# Patient Record
Sex: FEMALE | URBAN - METROPOLITAN AREA
[De-identification: names, ages, dates, MRNs, and addresses within clinical notes are randomized per-mention and may not be internally consistent; named-entity substitution may affect disease eponyms.]

---

## 2017-08-22 ENCOUNTER — INPATIENT (INPATIENT)
Facility: HOSPITAL | Age: 57
LOS: 1 days | Discharge: HOME | End: 2017-08-24
Attending: HOSPITALIST | Admitting: HOSPITALIST
Payer: MEDICAID

## 2017-08-22 VITALS
RESPIRATION RATE: 18 BRPM | OXYGEN SATURATION: 98 % | HEART RATE: 80 BPM | DIASTOLIC BLOOD PRESSURE: 98 MMHG | SYSTOLIC BLOOD PRESSURE: 144 MMHG | TEMPERATURE: 98 F

## 2017-08-22 LAB
ALBUMIN SERPL ELPH-MCNC: 4.2 G/DL — SIGNIFICANT CHANGE UP (ref 3.3–5)
ALP SERPL-CCNC: 56 U/L — SIGNIFICANT CHANGE UP (ref 40–120)
ALT FLD-CCNC: 21 U/L — SIGNIFICANT CHANGE UP (ref 4–33)
APTT BLD: 31.2 SEC — SIGNIFICANT CHANGE UP (ref 27.5–37.4)
AST SERPL-CCNC: 29 U/L — SIGNIFICANT CHANGE UP (ref 4–32)
BASOPHILS # BLD AUTO: 0.03 K/UL — SIGNIFICANT CHANGE UP (ref 0–0.2)
BASOPHILS NFR BLD AUTO: 0.5 % — SIGNIFICANT CHANGE UP (ref 0–2)
BILIRUB SERPL-MCNC: 0.4 MG/DL — SIGNIFICANT CHANGE UP (ref 0.2–1.2)
BUN SERPL-MCNC: 10 MG/DL — SIGNIFICANT CHANGE UP (ref 7–23)
CALCIUM SERPL-MCNC: 9.2 MG/DL — SIGNIFICANT CHANGE UP (ref 8.4–10.5)
CHLORIDE SERPL-SCNC: 102 MMOL/L — SIGNIFICANT CHANGE UP (ref 98–107)
CK MB BLD-MCNC: 1.36 NG/ML — SIGNIFICANT CHANGE UP (ref 1–4.7)
CK SERPL-CCNC: 92 U/L — SIGNIFICANT CHANGE UP (ref 25–170)
CO2 SERPL-SCNC: 24 MMOL/L — SIGNIFICANT CHANGE UP (ref 22–31)
CREAT SERPL-MCNC: 0.74 MG/DL — SIGNIFICANT CHANGE UP (ref 0.5–1.3)
EOSINOPHIL # BLD AUTO: 0.15 K/UL — SIGNIFICANT CHANGE UP (ref 0–0.5)
EOSINOPHIL NFR BLD AUTO: 2.6 % — SIGNIFICANT CHANGE UP (ref 0–6)
GLUCOSE SERPL-MCNC: 127 MG/DL — HIGH (ref 70–99)
HCT VFR BLD CALC: 40.9 % — SIGNIFICANT CHANGE UP (ref 34.5–45)
HGB BLD-MCNC: 14.2 G/DL — SIGNIFICANT CHANGE UP (ref 11.5–15.5)
IMM GRANULOCYTES # BLD AUTO: 0.02 # — SIGNIFICANT CHANGE UP
IMM GRANULOCYTES NFR BLD AUTO: 0.3 % — SIGNIFICANT CHANGE UP (ref 0–1.5)
INR BLD: 0.95 — SIGNIFICANT CHANGE UP (ref 0.88–1.17)
LYMPHOCYTES # BLD AUTO: 2.05 K/UL — SIGNIFICANT CHANGE UP (ref 1–3.3)
LYMPHOCYTES # BLD AUTO: 35 % — SIGNIFICANT CHANGE UP (ref 13–44)
MCHC RBC-ENTMCNC: 31.8 PG — SIGNIFICANT CHANGE UP (ref 27–34)
MCHC RBC-ENTMCNC: 34.7 % — SIGNIFICANT CHANGE UP (ref 32–36)
MCV RBC AUTO: 91.5 FL — SIGNIFICANT CHANGE UP (ref 80–100)
MONOCYTES # BLD AUTO: 0.54 K/UL — SIGNIFICANT CHANGE UP (ref 0–0.9)
MONOCYTES NFR BLD AUTO: 9.2 % — SIGNIFICANT CHANGE UP (ref 2–14)
NEUTROPHILS # BLD AUTO: 3.06 K/UL — SIGNIFICANT CHANGE UP (ref 1.8–7.4)
NEUTROPHILS NFR BLD AUTO: 52.4 % — SIGNIFICANT CHANGE UP (ref 43–77)
NRBC # FLD: 0 — SIGNIFICANT CHANGE UP
PLATELET # BLD AUTO: 204 K/UL — SIGNIFICANT CHANGE UP (ref 150–400)
PMV BLD: 11.6 FL — SIGNIFICANT CHANGE UP (ref 7–13)
POTASSIUM SERPL-MCNC: 4.1 MMOL/L — SIGNIFICANT CHANGE UP (ref 3.5–5.3)
POTASSIUM SERPL-SCNC: 4.1 MMOL/L — SIGNIFICANT CHANGE UP (ref 3.5–5.3)
PROT SERPL-MCNC: 7.5 G/DL — SIGNIFICANT CHANGE UP (ref 6–8.3)
PROTHROM AB SERPL-ACNC: 10.6 SEC — SIGNIFICANT CHANGE UP (ref 9.8–13.1)
RBC # BLD: 4.47 M/UL — SIGNIFICANT CHANGE UP (ref 3.8–5.2)
RBC # FLD: 12 % — SIGNIFICANT CHANGE UP (ref 10.3–14.5)
SODIUM SERPL-SCNC: 138 MMOL/L — SIGNIFICANT CHANGE UP (ref 135–145)
TROPONIN T SERPL-MCNC: < 0.06 NG/ML — SIGNIFICANT CHANGE UP (ref 0–0.06)
WBC # BLD: 5.85 K/UL — SIGNIFICANT CHANGE UP (ref 3.8–10.5)
WBC # FLD AUTO: 5.85 K/UL — SIGNIFICANT CHANGE UP (ref 3.8–10.5)

## 2017-08-22 RX ORDER — PROPRANOLOL HCL 160 MG
40 CAPSULE, EXTENDED RELEASE 24HR ORAL DAILY
Qty: 0 | Refills: 0 | Status: DISCONTINUED | OUTPATIENT
Start: 2017-08-22 | End: 2017-08-23

## 2017-08-22 RX ORDER — SIMVASTATIN 20 MG/1
20 TABLET, FILM COATED ORAL AT BEDTIME
Qty: 0 | Refills: 0 | Status: DISCONTINUED | OUTPATIENT
Start: 2017-08-22 | End: 2017-08-24

## 2017-08-22 RX ADMIN — SIMVASTATIN 20 MILLIGRAM(S): 20 TABLET, FILM COATED ORAL at 23:17

## 2017-08-22 NOTE — ED CDU PROVIDER NOTE - PROGRESS NOTE DETAILS
CDU LUIS Winston Progress Note:  Pt notes resolution of symptoms.  Pt states she never had weakness, but only numbness and tingling which has completely resolved.  Repeat neuro exam normal:  CN II-XII normal.  5/5 UE and LE strength.  Rapid alternating movements intact.  Negative Rhomberg.  Negative Pronator drift.  Ambulatory without assistance.  Sensation intact to light touch. MRI performed, awaiting results. Dr Mcgee  pt admitted overnight to CDU. Resting comfortable wo any complaints  w daughter at bedside. NO headache, no weakness or numbness. no cp or sob. vss. AOx3, no focal deficits. CN 2-12 grossly intact. nl gait. no meningeal signs. neg pronator drift. pending MRI read and neurology to  see pt. CDU LUIS Winston Progress Note:  Pt notes resolution of symptoms.  No headaches overnight or at this time.  Pt states she never had weakness, but only numbness and tingling which has completely resolved.  Repeat neuro exam normal:  CN II-XII normal.  5/5 UE and LE strength.  Rapid alternating movements intact.  Negative Rhomberg.  Negative Pronator drift.  Ambulatory without assistance.  Sensation intact to light touch. MRI performed, awaiting results. CDU LUIS Winston Progress Note:  Neurology recommending Echo and Loop recorder.  Called Dr. Maddox's office who states they do not take pt's insurance. Discussed case with PMD Dr. Cutler who states he recommends contacting Dr. Geronimo Gasca to establish outpatient cardiology follow up.  At this time, spoke with  Mechelle Tim who states she is unable to make an appointment until Dr. Gasca comes into office at 2:30 PM.  Awaiting call back at this time. CDU LU[facility] Winston Progress Note:  No call back at this time.  Called Dr. Gasca's office and spoke with Mechelle Tim who states that Dr. Gasca not yet in office and will have Dr. Gasca call me back.  Awaiting call back. CDU LU[facility] Winston Progress Note:  No call back at this time from Dr. Gasca.  Discussed case with Dr. Cutler who states that if pt unable to establish outpatient follow up, agrees with admission.  Called Dr. Gasca's office.  Informed staff that pt will be admitted at this time. Dr Mcgee  pt feeling better.  Pt and daughter informed of plan to admit given unable to ensure close outpt follow up. PT to be admitted for furhter workup for CVA. CDU PA Catrachito Admit Note:  Pt admitted to Dr. Gloria Mariee.  Tele PA text paged. CDU LU[facility] Winston Progress Note:  No call back at this time from Dr. Gasca.  Discussed case with Dr. Cutler who states that if pt unable to establish outpatient follow up, agrees with admission to hospitalist.  Called Dr. Gasca's office.  Informed staff that pt will be admitted at this time.

## 2017-08-22 NOTE — ED ADULT NURSE NOTE - CHIEF COMPLAINT QUOTE
pt feels heaviness in right side of body and        right side of head this episode started 45 minutes ago  pt took htn meds today right sided headache  FIT eval at triage  for stroke eval.finger stick is 130 at triage  code stroke called by holly hilario

## 2017-08-22 NOTE — ED PROVIDER NOTE - ATTENDING CONTRIBUTION TO CARE
Dr. Pompa:  I have personally performed a face to face bedside history and physical examination of this patient. I have discussed the history, examination, review of systems, assessment and plan of management with the resident. I have reviewed the electronic medical record and amended it to reflect my history, review of systems, physical exam, assessment and plan.    57F h/o HTN, HLD, presents with right upper and lower extremity weakness/tingling/spasm x 2 hours prior to ED presentation.  Denies fever/chills, cp, sob, n/v/d, urinary symptoms.  Denies any similar prior episodes.  Family caught her prior to falling.      Exam:  - nad  - rrr  - ctab  - abd soft, ntnd  - perrl, eomi, cn2-12 grossly intact, sensation to light touch grossly equal bilaterally, 5/5 strength in upper and lower extremity, finger to nose intact, gait normal    A/P  - consider TIA/CVA  - cbc, cmp, trop  - ekg  - CT head  - neuro consult

## 2017-08-22 NOTE — ED CDU PROVIDER NOTE - ATTENDING CONTRIBUTION TO CARE
Dr Pompa Dr. Pompa:  I performed a face to face bedside interview with patient regarding history of present illness, review of symptoms and past medical history. I completed an independent physical exam.  I have discussed patient's plan of care with PA.   I agree with note as stated above, having amended the EMR as needed to reflect my findings.   This includes HISTORY OF PRESENT ILLNESS, HIV, PAST MEDICAL/SURGICAL/FAMILY/SOCIAL HISTORY, ALLERGIES AND HOME MEDICATIONS, REVIEW OF SYSTEMS, PHYSICAL EXAM, and any PROGRESS NOTES during the time I functioned as the attending physician for this patient.    57F h/o HTN, HLD, presents with R sided weakness.    Exam:  - Dr. Pompa:  I performed a face to face bedside interview with patient regarding history of present illness, review of symptoms and past medical history. I completed an independent physical exam.  I have discussed patient's plan of care with PA.   I agree with note as stated above, having amended the EMR as needed to reflect my findings.   This includes HISTORY OF PRESENT ILLNESS, HIV, PAST MEDICAL/SURGICAL/FAMILY/SOCIAL HISTORY, ALLERGIES AND HOME MEDICATIONS, REVIEW OF SYSTEMS, PHYSICAL EXAM, and any PROGRESS NOTES during the time I functioned as the attending physician for this patient.    57F h/o HTN, HLD, presents with R sided weakness.    Exam:  - nad  - rrr  - ctab  - abd soft, ntnd  - no focal neuro deficits on exam, sensation to light touch and motor equal bilateral extremities    A/P  - R sided weakness now seemingly resolved, possible CVA/TIA  - CDU for MRI  - f/u neurology recommendations

## 2017-08-22 NOTE — CONSULT NOTE ADULT - ATTENDING COMMENTS
57-year-old right-handed lady of [place] descent first evaluated in the CDU at [facility] on 8/23/17 with right-sided weakness. On 8/22/17, she developed right-sided weakness of her arm and leg. She now feels improved. ROS otherwise negative. Exam. Neurologic exam was normal, including motor exam on the right. CT head (8/22/17) to my eye was unremarkable. MRI brain (8/23/17) to my eye showed a punctate left ROBERT infarct in the callosomarginal distribution. MRA neck and head (8/23/17) to my eye was unremarkable. Impression. On 8/22/17 she developed right hemiparesis with subsequent resolution, due to a small left ROBERT infarct of unknown cause, but consistent with embolic stroke of unknown source. Suggest. MACIE; implantable loop recorder; both can be done as an outpatient to be arranged with cardiology; aspirin and Plavix for 3 weeks, followed by stopping Plavix and continuing aspirin; neurologically cleared for discharge.

## 2017-08-22 NOTE — CONSULT NOTE ADULT - ASSESSMENT
56 y/o RH  woman with hx of HTN, HLD, inner ear problem p/w right ear pressure and subjective feeling over the right side. Neurological exam shows only slight drift in RLE. TPA not given as symptoms were improving and given the mildness of her symptoms she would likely not benefit from TPA and the risks were greater. This was discussed with the patient and family. Likely not an acute stroke, more likely related to her head pressure/pain.       - Tylenol for headache  -Continue to monitor  - If symptoms persist consider CDU for MRI/MRA head and neck, and start Asprin 81 daily.   - D/w Dr. Saucedo.

## 2017-08-22 NOTE — ED CDU PROVIDER NOTE - OBJECTIVE STATEMENT
58 yo F presenting with 2 hour hx of R sided arm and leg weakness. Pt became unstable because of the R leg weakness but did not fall or have head trauma or LOC. Pt also had associated heaviness of her head that has since resolved. No hx of sx. Pt did not tx at home and came straight to the hospital. Pt took propranolol today not simvastatin.     meds: propranolol, simvastatin    PCP: Austin Smith ED Provider note:56 yo F presenting with 2 hour hx of R sided arm and leg weakness. Pt became unstable because of the R leg weakness but did not fall or have head trauma or LOC. Pt also had associated heaviness of her head that has since resolved. No hx of sx. Pt did not tx at home and came straight to the hospital. Pt took propranolol today not simvastatin.   meds: propranolol, simvastatin  PCP: Austin Smith    CDU LUIS Barrientos note: I agree with above. 56 yo F presenting with 2 hour hx of R sided arm and leg weakness. Pt became unstable because of the R leg weakness but did not fall or have head trauma or LOC. Ct brain stroke protocol was unremarkable in ED, neuro evaluated the pt sent to CDU for MRI/ MRA and neuro re eval. Pt denies dizziness, weakness, blurry vision, numbness tingling, sob, cp, abdominal pain or dysuria. ED Provider note:56 yo F presenting with 2 hour hx of R sided arm and leg weakness. Pt became unstable because of the R leg weakness but did not fall or have head trauma or LOC. Pt also had associated heaviness of her head that has since resolved. No hx of sx. Pt did not tx at home and came straight to the hospital. Pt took propranolol today not simvastatin.   meds: propranolol, simvastatin  PCP: Austin Smith    CDU PA Floyd note: I agree with above. 56 yo F with pmhx of HTN, HLD, presents with 2 hour hx of R sided arm and leg weakness. Pt became unstable because of the R leg weakness but did not fall or have head trauma or LOC. Ct brain stroke protocol was unremarkable in ED, neuro evaluated the pt sent to CDU for MRI/ MRA and neuro re eval. Pt denies dizziness, weakness, blurry vision, numbness tingling, sob, cp, abdominal pain or dysuria.

## 2017-08-22 NOTE — ED ADULT TRIAGE NOTE - CHIEF COMPLAINT QUOTE
pt feels heaviness in right side of body and        right side of head this episode started 45 minutes ago  pt took htn meds today right sided headache  FIT eval at triage  for stroke eval. pt feels heaviness in right side of body and        right side of head this episode started 45 minutes ago  pt took htn meds today right sided headache  FIT eval at triage  for stroke eval.finger stick is 130 at triage  code stroke called pt feels heaviness in right side of body and        right side of head this episode started 45 minutes ago  pt took htn meds today right sided headache  FIT eval at triage  for stroke eval.finger stick is 130 at triage  code stroke called by holly hilario

## 2017-08-22 NOTE — ED ADULT NURSE NOTE - OBJECTIVE STATEMENT
patient is a 57 y.o female received as a code stroke a&ox4 presenting with a c/c of weakness in RUE and RLE.  patient last seen normal at 1630 hours.  upon assessment, patient GCS of 15, negative facial droop, pronator drift, slurred speech, aphasia.  Negative deficits in visual fields, blurry vision, diplopia, negative limb ataxia, strength in upper extremities noted to be equal.  RLE noted to have deficits in comparison to LLE.  patient reports HA 5 out of 10.  20 gauge PIV placed in left ac, labs drawn, md presently evaluating.

## 2017-08-22 NOTE — ED PROVIDER NOTE - CRANIAL NERVE AND PUPILLARY EXAM
cranial nerves 2-12 intact/tongue is midline/central and peripheral vision intact/extra-ocular movements intact

## 2017-08-22 NOTE — ED PROVIDER NOTE - MEDICAL DECISION MAKING DETAILS
58 yo F with PMH of HTN and HLD presenting with 2 hours of R arm and R leg numbness. Neuro exam normal. Head CT normal. Will admit for MRI of brain per neuro recs.

## 2017-08-22 NOTE — ED PROVIDER NOTE - OBJECTIVE STATEMENT
58 yo F presenting with 2 hour hx of R sided arm and leg weakness. Pt became unstable because of the R leg weakness but did not fall or have head trauma or LOC. Pt also had associated heaviness of her head that has since resolved. No hx of sx. Pt did not tx at home and came straight to the hospital. Pt took propranolol today not simvastatin.     meds: propranolol, simvastatin    PCP: Austin Smith

## 2017-08-22 NOTE — ED PROVIDER NOTE - FAMILY HISTORY
Mother  Still living? Unknown  Family history of stroke, Age at diagnosis: Age Unknown  Family history of hypertension, Age at diagnosis: Age Unknown     Father  Still living? Unknown  Family history of hypertension, Age at diagnosis: Age Unknown

## 2017-08-22 NOTE — ED CDU PROVIDER NOTE - MEDICAL DECISION MAKING DETAILS
58 y/o F pt sent to CDU by neuro for neuro checks, MRI / MRA , neuro re eval, will monitor and reassess

## 2017-08-23 DIAGNOSIS — I10 ESSENTIAL (PRIMARY) HYPERTENSION: ICD-10-CM

## 2017-08-23 DIAGNOSIS — R73.03 PREDIABETES: ICD-10-CM

## 2017-08-23 DIAGNOSIS — I63.9 CEREBRAL INFARCTION, UNSPECIFIED: ICD-10-CM

## 2017-08-23 DIAGNOSIS — Z29.9 ENCOUNTER FOR PROPHYLACTIC MEASURES, UNSPECIFIED: ICD-10-CM

## 2017-08-23 DIAGNOSIS — E78.00 PURE HYPERCHOLESTEROLEMIA, UNSPECIFIED: ICD-10-CM

## 2017-08-23 LAB
CK MB BLD-MCNC: 1.27 NG/ML — SIGNIFICANT CHANGE UP (ref 1–4.7)
CK MB BLD-MCNC: SIGNIFICANT CHANGE UP (ref 0–2.5)
CK SERPL-CCNC: 63 U/L — SIGNIFICANT CHANGE UP (ref 25–170)
HBA1C BLD-MCNC: 5.8 % — HIGH (ref 4–5.6)
TROPONIN T SERPL-MCNC: < 0.06 NG/ML — SIGNIFICANT CHANGE UP (ref 0–0.06)

## 2017-08-23 PROCEDURE — 70544 MR ANGIOGRAPHY HEAD W/O DYE: CPT | Mod: 26,59

## 2017-08-23 PROCEDURE — 70551 MRI BRAIN STEM W/O DYE: CPT | Mod: 26

## 2017-08-23 PROCEDURE — 99223 1ST HOSP IP/OBS HIGH 75: CPT | Mod: GC

## 2017-08-23 PROCEDURE — 70548 MR ANGIOGRAPHY NECK W/DYE: CPT | Mod: 26

## 2017-08-23 RX ORDER — CLOPIDOGREL BISULFATE 75 MG/1
75 TABLET, FILM COATED ORAL DAILY
Qty: 0 | Refills: 0 | Status: DISCONTINUED | OUTPATIENT
Start: 2017-08-23 | End: 2017-08-24

## 2017-08-23 RX ORDER — PROPRANOLOL HCL 160 MG
40 CAPSULE, EXTENDED RELEASE 24HR ORAL DAILY
Qty: 0 | Refills: 0 | Status: DISCONTINUED | OUTPATIENT
Start: 2017-08-24 | End: 2017-08-24

## 2017-08-23 RX ORDER — PROPRANOLOL HCL 160 MG
1 CAPSULE, EXTENDED RELEASE 24HR ORAL
Qty: 0 | Refills: 0 | COMMUNITY

## 2017-08-23 RX ORDER — SIMVASTATIN 20 MG/1
1 TABLET, FILM COATED ORAL
Qty: 0 | Refills: 0 | COMMUNITY

## 2017-08-23 RX ORDER — HEPARIN SODIUM 5000 [USP'U]/ML
5000 INJECTION INTRAVENOUS; SUBCUTANEOUS EVERY 12 HOURS
Qty: 0 | Refills: 0 | Status: DISCONTINUED | OUTPATIENT
Start: 2017-08-23 | End: 2017-08-24

## 2017-08-23 RX ORDER — ASPIRIN/CALCIUM CARB/MAGNESIUM 324 MG
81 TABLET ORAL DAILY
Qty: 0 | Refills: 0 | Status: DISCONTINUED | OUTPATIENT
Start: 2017-08-23 | End: 2017-08-24

## 2017-08-23 RX ADMIN — Medication 40 MILLIGRAM(S): at 07:03

## 2017-08-23 NOTE — H&P ADULT - FAMILY HISTORY
<<-----Click on this checkbox to enter Family History Family history of hypertension     Mother  Still living? No  Family history of stroke, Age at diagnosis: Age Unknown

## 2017-08-23 NOTE — H&P ADULT - PROBLEM SELECTOR PLAN 1
Patient with positive MRI findings of acute CVA   Will continue to monitor on telemetry  TSH, lipid profile, CBC, CMP in am   House neurology following and their recommendation noted   TTE with bubble study   Continue with aspirin 81mg, Plavix 75mg and Zocor 20mg QHS daily   Seizure, fall and aspiration precautions   Elevate head of the bed to 30 degree  PMNR ordered   Neuro checks q4hrs Patient with positive MRI findings of acute CVA   Will continue to monitor on telemetry  TSH, lipid profile, CBC, CMP in am   House neurology following and their recommendation noted   TTE with bubble study   Continue with aspirin 81mg, Plavix 75mg and Zocor 20mg QHS daily   Seizure, fall and aspiration precautions   Elevate head of the bed to 30 degree  PMNR ordered   Neuro checks q4hrs  Will need to call house cardiology consult in am Right arm weakness and right leg weakness 2/2 CVA   Patient with positive MRI findings of acute CVA   Will continue to monitor on telemetry  TSH, lipid profile, CBC, CMP in am   House neurology following and their recommendation noted   TTE with bubble study   Continue with aspirin 81mg, Plavix 75mg and Zocor 20mg QHS daily   Seizure, fall and aspiration precautions   Elevate head of the bed to 30 degree  PMNR ordered   Neuro checks q4hrs  Will need to call house cardiology consult in am

## 2017-08-23 NOTE — H&P ADULT - RS GEN PE MLT RESP DETAILS PC
clear to auscultation bilaterally/breath sounds equal/airway patent/no wheezes/no rales/good air movement/respirations non-labored/no chest wall tenderness/no intercostal retractions/normal/no rhonchi

## 2017-08-23 NOTE — H&P ADULT - NEGATIVE GASTROINTESTINAL SYMPTOMS
no change in bowel habits/no diarrhea/no melena/no constipation/no vomiting/no abdominal pain/no nausea/no hematochezia

## 2017-08-23 NOTE — H&P ADULT - NEGATIVE CARDIOVASCULAR SYMPTOMS
no orthopnea/no palpitations/no chest pain/no paroxysmal nocturnal dyspnea/no dyspnea on exertion/no peripheral edema

## 2017-08-23 NOTE — H&P ADULT - NEGATIVE ENMT SYMPTOMS
no nasal congestion/no hearing difficulty/no nasal discharge/no ear pain/no sinus symptoms/no vertigo/no tinnitus

## 2017-08-23 NOTE — H&P ADULT - NEGATIVE NEUROLOGICAL SYMPTOMS
no tremors/no hemiparesis/no vertigo/no focal seizures/no generalized seizures/no loss of consciousness/no loss of sensation/no difficulty walking/no transient paralysis/no syncope/no confusion

## 2017-08-23 NOTE — H&P ADULT - GASTROINTESTINAL DETAILS
no distention/no guarding/nontender/no rigidity/bowel sounds normal/normal/no rebound tenderness/soft

## 2017-08-23 NOTE — H&P ADULT - NEGATIVE MUSCULOSKELETAL SYMPTOMS
no arthritis/no muscle weakness/no neck pain/no myalgia/no arthralgia/no muscle cramps/no stiffness/no joint swelling

## 2017-08-23 NOTE — H&P ADULT - NEGATIVE OPHTHALMOLOGIC SYMPTOMS
no lacrimation R/no lacrimation L/no discharge R/no photophobia/no diplopia/no blurred vision R/no blurred vision L/no discharge L

## 2017-08-23 NOTE — H&P ADULT - ASSESSMENT
58 y/o F with PMH of GERD, HLD, HTN presented with the complaint of right sided arm and leg weakness. R/o CVA    +Acute-subacute lacunar infarct of the left posterior/medial frontal lobe  NIHSS 1				 MRS 0 			Dysphagia=Passed

## 2017-08-23 NOTE — H&P ADULT - HISTORY OF PRESENT ILLNESS
*HPI and ROS was obtained from patient's daughters and  who was at bedside and assisted with translation*    58 y/o F with PMH of GERD, HLD, HTN presented with the complaint of right sided arm and leg weakness. As per patient she was in the shower yesterday at 5pm when she developed sudden onset right sided upper and lower extremity weakness and numbness and she felt an odd sensation on the right side and stumbled a little. Family denied her falling or hitting her head. Patient also endorsed occipital head "pressure" when the weakness started. Family stated that after this episode she was able to ambulate with assistance. Family denied any changes in vision, slurred speech, contralateral weakness or facial droop. Family stated that she has a hx of ear problems and diagnosed by ENT with eustachian tube collapse which she experiences as a pressure-like sensation but no dizziness. Patient denied any CP, SOB, fevers, chills, N/V/D/C, headaches, abdominal pain, melena, hematochezia, recent travel, sick contact, pleuritic or positional chest pain.     On ED admission EKG revealed CE x 1: Negative, Gluc: 127, HgbA1C: 5.8. CT head: No acute intracranial bleeding, mass effect, or evidence of an acute territorial infarct. MRI head: Acute-subacute lacunar infarct of the left posterior/medial frontal lobe. No acute hemorrhage. BRAIN MRA:  No significant stenosis, occlusion, or aneurysm. NECK MRA: Extracranial MR angiography is within normal limits by NASCET criteria. 8/23 Neurology: Suggest. MACIE; implantable loop recorder; both can be done as an outpatient to be arranged with cardiology; aspirin and Plavix for 3 weeks, followed by stopping Plavix and continuing aspirin; neurologically cleared for discharge. When examined patient is resting in the stretcher and endorsed right shoulder pain, denied any other complaints. *HPI and ROS was obtained from patient's daughters and  who was at bedside and assisted with translation*    56 y/o F with PMH of GERD, HLD, HTN presented with the complaint of right sided arm and leg weakness. As per patient she was in the shower yesterday at 5pm when she developed sudden onset right sided upper and lower extremity weakness and numbness and she felt an odd sensation on the right side and stumbled a little. Family denied her falling or hitting her head. Patient also endorsed occipital head "pressure" when the weakness started. Family stated that after this episode she was able to ambulate with assistance. Family denied any changes in vision, slurred speech, contralateral weakness or facial droop. Family stated that she has a hx of ear problems and diagnosed by ENT with eustachian tube collapse which she experiences as a pressure-like sensation but no dizziness. Patient denied any CP, SOB, fevers, chills, N/V/D/C, headaches, abdominal pain, melena, hematochezia, recent travel, sick contact, pleuritic or positional chest pain.     On ED admission EKG revealed NSR at a rate of 76 with possible left atrial enlargement, with TWI in lead V2-V3. Qtc of 425, CE x 1: Negative, Gluc: 127, HgbA1C: 5.8. CT head: No acute intracranial bleeding, mass effect, or evidence of an acute territorial infarct. MRI head: Acute-subacute lacunar infarct of the left posterior/medial frontal lobe. No acute hemorrhage. BRAIN MRA:  No significant stenosis, occlusion, or aneurysm. NECK MRA: Extracranial MR angiography is within normal limits by NASCET criteria. 8/23 Neurology: Suggest. MACIE; implantable loop recorder; both can be done as an outpatient to be arranged with cardiology; aspirin and Plavix for 3 weeks, followed by stopping Plavix and continuing aspirin; neurologically cleared for discharge. When examined patient is resting in the stretcher and endorsed right shoulder pain, denied any other complaints. *HPI and ROS was obtained from patient's daughters and  who was at bedside and assisted with translation*    58 y/o F with PMH of HLD, HTN presented with the complaint of right sided arm and leg weakness. As per patient she was in the shower yesterday at 5pm when she developed sudden onset right sided upper and lower extremity weakness and numbness and she felt an odd sensation on the right side and stumbled a little. Family denied her falling or hitting her head. Patient also endorsed occipital head "pressure" when the weakness started. Family stated that after this episode she was able to ambulate with assistance. Family denied any changes in vision, slurred speech, contralateral weakness or facial droop. Family stated that she has a hx of ear problems and diagnosed by ENT with eustachian tube collapse which she experiences as a pressure-like sensation but no dizziness. Patient denied any CP, SOB, fevers, chills, N/V/D/C, abdominal pain, melena, hematochezia, dysuria, recent travel, sick contact, pleuritic or positional chest pain.     On ED admission EKG revealed NSR at a rate of 76 with possible left atrial enlargement, with TWI in lead V2-V3. Qtc of 425, CE x 1: Negative, Gluc: 127, HgbA1C: 5.8. CT head: No acute intracranial bleeding, mass effect, or evidence of an acute territorial infarct. MRI head: Acute-subacute lacunar infarct of the left posterior/medial frontal lobe. No acute hemorrhage. BRAIN MRA:  No significant stenosis, occlusion, or aneurysm. NECK MRA: Extracranial MR angiography is within normal limits by NASCET criteria. 8/23 Neurology: Suggest. MACIE; implantable loop recorder; both can be done as an outpatient to be arranged with cardiology; aspirin and Plavix for 3 weeks, followed by stopping Plavix and continuing aspirin; neurologically cleared for discharge. When examined patient is resting in the stretcher and endorsed right shoulder pain, denied any other complaints.

## 2017-08-23 NOTE — H&P ADULT - PROBLEM SELECTOR PLAN 4
HgbA1C of 5.8   Instructed patient about   FS TID and at bedtime HgbA1C of 5.8   Patient counselled on diet and exercise  FS TID and at bedtime

## 2017-08-23 NOTE — H&P ADULT - NSHPLABSRESULTS_GEN_ALL_CORE
.  LABS:                         14.2   5.85  )-----------( 204      ( 22 Aug 2017 18:13 )             40.9     08-22    138  |  102  |  10  ----------------------------<  127<H>  4.1   |  24  |  0.74    Ca    9.2      22 Aug 2017 18:13    TPro  7.5  /  Alb  4.2  /  TBili  0.4  /  DBili  x   /  AST  29  /  ALT  21  /  AlkPhos  56  08-22    PT/INR - ( 22 Aug 2017 18:13 )   PT: 10.6 SEC;   INR: 0.95          PTT - ( 22 Aug 2017 18:13 )  PTT:31.2 SEC    CARDIAC MARKERS ( 23 Aug 2017 21:15 )  x     / < 0.06 ng/mL / 63 u/L / 1.27 ng/mL / x      CARDIAC MARKERS ( 22 Aug 2017 18:13 )  x     / < 0.06 ng/mL / 92 u/L / 1.36 ng/mL / x        EKG reviewed personally:  normal sinus rhythm at a rate of 76 with possible left atrial enlargement, with TWI in lead V2-V3. Qtc of 425    RADIOLOGY, EKG & ADDITIONAL TESTS: Reviewed.

## 2017-08-24 ENCOUNTER — TRANSCRIPTION ENCOUNTER (OUTPATIENT)
Age: 57
End: 2017-08-24

## 2017-08-24 VITALS
DIASTOLIC BLOOD PRESSURE: 78 MMHG | TEMPERATURE: 98 F | OXYGEN SATURATION: 99 % | HEART RATE: 70 BPM | RESPIRATION RATE: 16 BRPM | SYSTOLIC BLOOD PRESSURE: 148 MMHG

## 2017-08-24 LAB
BUN SERPL-MCNC: 14 MG/DL — SIGNIFICANT CHANGE UP (ref 7–23)
CALCIUM SERPL-MCNC: 9.5 MG/DL — SIGNIFICANT CHANGE UP (ref 8.4–10.5)
CHLORIDE SERPL-SCNC: 103 MMOL/L — SIGNIFICANT CHANGE UP (ref 98–107)
CHOLEST SERPL-MCNC: 255 MG/DL — HIGH (ref 120–199)
CO2 SERPL-SCNC: 24 MMOL/L — SIGNIFICANT CHANGE UP (ref 22–31)
CREAT SERPL-MCNC: 0.72 MG/DL — SIGNIFICANT CHANGE UP (ref 0.5–1.3)
GLUCOSE SERPL-MCNC: 98 MG/DL — SIGNIFICANT CHANGE UP (ref 70–99)
HCT VFR BLD CALC: 46.8 % — HIGH (ref 34.5–45)
HDLC SERPL-MCNC: 51 MG/DL — SIGNIFICANT CHANGE UP (ref 45–65)
HGB BLD-MCNC: 16.4 G/DL — HIGH (ref 11.5–15.5)
LIPID PNL WITH DIRECT LDL SERPL: 191 MG/DL — SIGNIFICANT CHANGE UP
MAGNESIUM SERPL-MCNC: 2.1 MG/DL — SIGNIFICANT CHANGE UP (ref 1.6–2.6)
MCHC RBC-ENTMCNC: 32.9 PG — SIGNIFICANT CHANGE UP (ref 27–34)
MCHC RBC-ENTMCNC: 35 % — SIGNIFICANT CHANGE UP (ref 32–36)
MCV RBC AUTO: 93.8 FL — SIGNIFICANT CHANGE UP (ref 80–100)
NRBC # FLD: 0 — SIGNIFICANT CHANGE UP
PLATELET # BLD AUTO: 199 K/UL — SIGNIFICANT CHANGE UP (ref 150–400)
PMV BLD: 11.6 FL — SIGNIFICANT CHANGE UP (ref 7–13)
POTASSIUM SERPL-MCNC: 3.7 MMOL/L — SIGNIFICANT CHANGE UP (ref 3.5–5.3)
POTASSIUM SERPL-SCNC: 3.7 MMOL/L — SIGNIFICANT CHANGE UP (ref 3.5–5.3)
RBC # BLD: 4.99 M/UL — SIGNIFICANT CHANGE UP (ref 3.8–5.2)
RBC # FLD: 12.3 % — SIGNIFICANT CHANGE UP (ref 10.3–14.5)
SODIUM SERPL-SCNC: 142 MMOL/L — SIGNIFICANT CHANGE UP (ref 135–145)
T4 FREE SERPL-MCNC: 1.44 NG/DL — SIGNIFICANT CHANGE UP (ref 0.9–1.8)
TRIGL SERPL-MCNC: 170 MG/DL — HIGH (ref 10–149)
TSH SERPL-MCNC: 4.76 UIU/ML — HIGH (ref 0.27–4.2)
WBC # BLD: 7.77 K/UL — SIGNIFICANT CHANGE UP (ref 3.8–10.5)
WBC # FLD AUTO: 7.77 K/UL — SIGNIFICANT CHANGE UP (ref 3.8–10.5)

## 2017-08-24 PROCEDURE — 99239 HOSP IP/OBS DSCHRG MGMT >30: CPT

## 2017-08-24 PROCEDURE — 73030 X-RAY EXAM OF SHOULDER: CPT | Mod: 26,RT

## 2017-08-24 PROCEDURE — 93306 TTE W/DOPPLER COMPLETE: CPT | Mod: 26

## 2017-08-24 RX ORDER — CLOPIDOGREL BISULFATE 75 MG/1
1 TABLET, FILM COATED ORAL
Qty: 20 | Refills: 0 | OUTPATIENT
Start: 2017-08-24 | End: 2017-09-13

## 2017-08-24 RX ORDER — ASPIRIN/CALCIUM CARB/MAGNESIUM 324 MG
1 TABLET ORAL
Qty: 0 | Refills: 0 | COMMUNITY
Start: 2017-08-24

## 2017-08-24 RX ADMIN — Medication 81 MILLIGRAM(S): at 12:20

## 2017-08-24 RX ADMIN — SIMVASTATIN 20 MILLIGRAM(S): 20 TABLET, FILM COATED ORAL at 00:27

## 2017-08-24 RX ADMIN — CLOPIDOGREL BISULFATE 75 MILLIGRAM(S): 75 TABLET, FILM COATED ORAL at 12:20

## 2017-08-24 RX ADMIN — HEPARIN SODIUM 5000 UNIT(S): 5000 INJECTION INTRAVENOUS; SUBCUTANEOUS at 06:57

## 2017-08-24 RX ADMIN — Medication 40 MILLIGRAM(S): at 06:58

## 2017-08-24 NOTE — CONSULT NOTE ADULT - ASSESSMENT
This is a 58 yo female with past medical history of hypertension and dyslipidemia who presented with an acute CVA. There is no EKG or telemetry evidence of an atrial arrhythmia. Patient would likely benefit from prolonged  cardiac monitoring for detection of occult atrial fibrillation as a cause of a cardioembolic stroke. This is a 56 yo female with past medical history of hypertension and dyslipidemia who presented with an acute CVA. There is no EKG or telemetry evidence of an atrial arrhythmia. Patient would likely benefit from prolonged  cardiac monitoring for detection of occult atrial fibrillation as a cause of a cardioembolic stroke. Patient prefers to be discharged and follow-up as an outpatient. She has an appointment with Dr. Landers on August 31,2017 at 11:00am.

## 2017-08-24 NOTE — DISCHARGE NOTE ADULT - NS AS DC STROKE ED MATERIALS
Risk Factors for Stroke/Stroke Education Booklet/Need for Followup After Discharge/Call 911 for Stroke/Prescribed Medications/Stroke Warning Signs and Symptoms

## 2017-08-24 NOTE — DISCHARGE NOTE ADULT - MEDICATION SUMMARY - MEDICATIONS TO TAKE
I will START or STAY ON the medications listed below when I get home from the hospital:    aspirin 81 mg oral delayed release tablet  -- 1 tab(s) by mouth once a day  -- Indication: For CVA (cerebral vascular accident)    propranolol 40 mg oral tablet  -- 1 tab(s) by mouth once a day  -- Indication: For HTN (hypertension)    simvastatin 20 mg oral tablet  -- 1 tab(s) by mouth once a day (at bedtime)  -- Indication: For HLD (hyperlipidemia)    clopidogrel 75 mg oral tablet  -- 1 tab(s) by mouth once a day for 20 days and then stop  -- Indication: For CVA (cerebral vascular accident)

## 2017-08-24 NOTE — PROGRESS NOTE ADULT - PROBLEM SELECTOR PLAN 5
On heparin sq 5000U q12hrs  - Pt is medically stable for d/c home with neurology and EP follow up  - D/C TIme 35 minutes

## 2017-08-24 NOTE — DISCHARGE NOTE ADULT - HOSPITAL COURSE
56 y/o female with a PMHx of HLD, HTN, and GERD presented to ED with right sided arm and leg weakness. CT head revealed no acute intracranial bleeding, mass effect, or evidence of an acute territorial infarct. Pt was observed in CDU as recommended by neuro given low suspicion for stroke. Pt underwent MRI/MRA head/neck, which revealed, "Acute-subacute lacunar infarct of the left posterior/medial frontal lobe. No acute hemorrhage. No significant stenosis, occlusion, or aneurysm. Extracranial MR angiography is within normal limits by NASCET criteria." Pt had minimal deficits and neuro recommended outpatient ILR. Arrangements made with EP. Pt on ASA and Plavix for 3 weeks, then ASA only. PT evaluated patient and she has no needs. Discussed with attending on 8/24. Pt now stable for discharge home with outpatient neuro and EP follow up. 58 y/o female with a PMHx of HLD, HTN, and GERD presented to ED with right sided arm and leg weakness. CT head revealed no acute intracranial bleeding, mass effect, or evidence of an acute territorial infarct. Pt was observed in CDU as recommended by neuro given low suspicion for stroke. Pt underwent MRI/MRA head/neck, which revealed, "Acute-subacute lacunar infarct of the left posterior/medial frontal lobe. No acute hemorrhage. No significant stenosis, occlusion, or aneurysm. Extracranial MR angiography is within normal limits by NASCET criteria." Pt had minimal deficits and neuro recommended outpatient ILR. Arrangements made with EP. Pt on ASA and Plavix for 3 weeks, then ASA only. PT evaluated patient and she has no needs. Discussed with attending on 8/24. No need to wait for echo results. No events on tele. Outpatient ILR follow up. Pt now stable for discharge home with outpatient neuro and EP follow up.

## 2017-08-24 NOTE — PROGRESS NOTE ADULT - PROBLEM SELECTOR PLAN 1
- Patient with positive MRI findings of acute CVA   - Pt to have loop recorder placed as outpatient   - Continue with ASA/Plavix for 3 weeks, then only ASA.  C/W statin, dietary modification counseling done   - TTE reviewed

## 2017-08-24 NOTE — DISCHARGE NOTE ADULT - PATIENT PORTAL LINK FT
“You can access the [facility] Patient Portal, offered by [facility], by registering with the following website: [email or web address]”

## 2017-08-24 NOTE — DISCHARGE NOTE ADULT - CARE PLAN
Principal Discharge DX:	CVA (cerebral vascular accident)  Goal:	Continue ASA and Plavix as prescribed to prevent future stroke.  Secondary Diagnosis:	HTN (hypertension)  Goal:	Maintain adequate control of your blood pressure. Goal BP < 130/80. Continue low sodium diet.  Instructions for follow-up, activity and diet:	Follow up with PCP and/or cardiologist for ongoing medical management of your hypertension. Continue medications as prescribed. Low salt diet.  Secondary Diagnosis:	HLD (hyperlipidemia)  Goal:	Maintain adequate control of your cholesterol levels. Goal LDL < 70.  Instructions for follow-up, activity and diet:	Follow up with PCP for ongoing medical management. Continue medications as prescribed. Low cholesterol diet. Principal Discharge DX:	CVA (cerebral vascular accident)  Goal:	Continue ASA and Plavix as prescribed to prevent future stroke.  Instructions for follow-up, activity and diet:	Follow up with neurologist as outpatient for ongoing management. Follow up with the EP team to have a loop recorder placed. Continue medications as prescribed.  Secondary Diagnosis:	HTN (hypertension)  Goal:	Maintain adequate control of your blood pressure. Goal BP < 130/80. Continue low sodium diet.  Instructions for follow-up, activity and diet:	Follow up with PCP and/or cardiologist for ongoing medical management of your hypertension. Continue medications as prescribed. Low salt diet.  Secondary Diagnosis:	HLD (hyperlipidemia)  Goal:	Maintain adequate control of your cholesterol levels. Goal LDL < 70.  Instructions for follow-up, activity and diet:	Follow up with PCP for ongoing medical management. Continue medications as prescribed. Low cholesterol diet.

## 2017-08-24 NOTE — PROGRESS NOTE ADULT - ASSESSMENT
56 y/o F with PMH of GERD, HLD, HTN presented with the complaint of right sided arm and leg weakness. Found to have acute CVA.

## 2017-08-24 NOTE — DISCHARGE NOTE ADULT - ADDITIONAL INSTRUCTIONS
1. Follow up with neurologist as outpatient for ongoing management. Follow up with the EP team to have a loop recorder placed. Continue medications as prescribed.  2. Follow up with EP (Dr. Landers) for loop recorder placement. Information given to your from EP. 1. Follow up with neurologist as outpatient for ongoing management. Follow up with the EP team to have a loop recorder placed. Continue medications as prescribed.  2. Follow up with EP (Dr. Landers) for loop recorder placement. Information given to your from EP. Appt made for you on 8/31/17 at 11AM.

## 2017-08-24 NOTE — CONSULT NOTE ADULT - SUBJECTIVE AND OBJECTIVE BOX
and daughter at beside.  Patient prefers her daughter to interpret.  This is a 56 yo female with history of hypertension and dyslipidemia, compliant with medication, who presented with sudden onset right sided weakness.   Brain  MRI positive for acute/subacute lacunar infarct of posterior/medial left frontal lobe. No hemorrhagic conversion. She denies history  of chest pain or shortness of breath but does have episodes of her "whole body shaking". These episodes occur infrequently and last only seconds. Her admission EKG showed normal sinus rhythm 76bpm with TWI V2-V3 and QTc 425. No evidence of atrial arrhythmia on telemetry. We are asked to evaluate her for an ILR.      PAST MEDICAL & SURGICAL HISTORY:  Hypercholesterolemia  Hypertension  Inner Ear malady with disequilibrium   delivery delivered      MEDICATIONS  (STANDING):  simvastatin 20 milliGRAM(s) Oral at bedtime  aspirin enteric coated 81 milliGRAM(s) Oral daily  propranolol 40 milliGRAM(s) Oral daily  clopidogrel Tablet 75 milliGRAM(s) Oral daily  heparin  Injectable 5000 Unit(s) SubCutaneous every 12 hours    MEDICATIONS  (PRN):      FAMILY HISTORY:  Family history of hypertension  Family history of stroke (Mother)  Family history of CAD/MI      SOCIAL HISTORY:    CIGARETTES: never    ALCOHOL: never    REVIEW OF SYSTEMS:    CONSTITUTIONAL: No fever, weight loss, chills, shakes, or fatigue  EYES: No eye pain, visual disturbances, or discharge  ENMT:  No difficulty hearing, tinnitus, vertigo. Inner ear disorder with imbalance.  NECK: No pain or stiffness  BREASTS: No pain, masses, or nipple discharge  RESPIRATORY: No cough, wheezing, hemoptysis, or shortness of breath  CARDIOVASCULAR: No chest pain, dyspnea, palpitations, dizziness, syncope, paroxysmal nocturnal dyspnea, orthopnea, or arm or leg swelling  GASTROINTESTINAL: No abdominal  or epigastric pain, nausea, vomiting, hematemesis, diarrhea, constipation, melena or bright red blood.  GENITOURINARY: No dysuria, nocturia, hematuria, or urinary incontinence  NEUROLOGICAL:  + headaches  No memory loss or slurred speech.  Admitted with sudden onset right sided  weakness. +Episodic whole body shaking  SKIN: No itching, burning, rashes, or lesions   LYMPH NODES: No enlarged glands  ENDOCRINE: No heat or cold intolerance, or hair loss  MUSCULOSKELETAL: No joint pain or swelling, muscle, back, or extremity pain  PSYCHIATRIC: No depression, anxiety, or difficulty sleeping  HEME/LYMPH: No easy bruising or bleeding gums  ALLERGY AND IMMUNOLOGIC: No hives or rash.      Vital Signs Last 24 Hrs  T(C): 36.7 (24 Aug 2017 04:50), Max: 36.8 (23 Aug 2017 15:30)  T(F): 98 (24 Aug 2017 04:50), Max: 98.2 (23 Aug 2017 15:30)  HR: 72 (24 Aug 2017 04:50) (68 - 83)  BP: 111/76 (24 Aug 2017 04:50) (111/76 - 138/73)  BP(mean): --  RR: 16 (24 Aug 2017 04:50) (14 - 16)  SpO2: 100% (24 Aug 2017 04:50) (100% - 100%)    PHYSICAL EXAM:    GENERAL: In no apparent distress, well nourished, and hydrated.  HEAD:  Atraumatic, Normocephalic  EYES: EOMI, PERRLA, conjunctiva and sclera clear  ENMT: No tonsillar erythema, exudates, or enlargement; Moist mucous membranes, Good dentition, No lesions  NECK: Supple and normal thyroid.  No JVD or carotid bruit.  Carotid pulse is 2+ bilaterally.  HEART: Regular rate and rhythm; No murmurs, rubs, or gallops.  PULMONARY: Clear to auscultation and perfusion.  No rales, wheezing, or rhonchi bilaterally.  ABDOMEN: Soft, Nontender, Nondistended; Bowel sounds present  EXTREMITIES:  2+ Peripheral Pulses, No clubbing, cyanosis, or edema  LYMPH: No lymphadenopathy noted  NEUROLOGICAL: Grossly nonfocal. No residual right sided weakness    ECG: Normal sinus rhythm     LABS: 2017                        16.4   7.77  )-----------( 199                   46.8       142  |  103  |  14  ----------------------------<  98  3.7   |  24  |  0.72    Ca    9.5      24 Aug 2017 07:00  Mg     2.1     08-    TPro  7.5  /  Alb  4.2  /  TBili  0.4  /  DBili  x   /  AST  29  /  ALT  21  /  AlkPhos  56  08-    CARDIAC MARKERS ( 23 Aug 2017 21:15 )  x     / < 0.06 ng/mL / 63 u/L / 1.27 ng/mL / x      CARDIAC MARKERS ( 22 Aug 2017 18:13 )  x     / < 0.06 ng/mL / 92 u/L / 1.36 ng/mL / x          PT: 10.6 SEC;   INR: 0.95          PTT - ( 22 Aug 2017 18:13 )  PTT:31.2 SEC    RADIOLOGY & ADDITIONAL STUDIES:  PREVIOUS DIAGNOSTIC TESTING:      ECHO  FINDINGS: none available    STRESS  FINDINGS:  none available    CATHETERIZATION  FINDINGS:           non availabl
KRISH COKER Patient is a 57y old  Female who presents with a chief complaint of right sided weakness.     HPI: Pt is a 58 y/o RH  woman with PMH sig for HTN, HLD p/w right sided weakness and head pressure. LKN at 4:30 per family. Pt went into the shower and during she felt odd sensation on the right side and stumbled a little. She is complaining of right sided feeling off and head pressure like sensation in the right ear. She has a hx of ear problems and diagnosed by ENT with eustachian tube collapse which she experiences as a pressure-like sensation but no dizziness. Pt says her right side feels off but not weak and the head pressure resolved.   NIHSS 1, MRS 0.     MEDICATIONS  (STANDING): Propranolol 40mg, Simvastatin 20mg QOD, Fish oil       PAST MEDICAL & SURGICAL HISTORY:  GERD (gastroesophageal reflux disease)  Hypercholesterolemia  Hypertension   delivery delivered    FAMILY HISTORY:  Family history of hypertension (Mother, Father)  Family history of stroke (Mother)      No Known Allergies    No Intolerances    SHx - No smoking, No ETOH, No drug abuse      Review of Systems:  CONSTITUTIONAL:   HEENT:  No visual loss, blurred vision, double vision.  No hearing loss, sneezing, congestion, runny nose or sore throat.  SKIN:  No rash or itching.  CARDIOVASCULAR:  No chest pain, chest pressure or chest discomfort. No palpitations or edema.  RESPIRATORY:  No shortness of breath, cough or sputum.  GASTROINTESTINAL:  No anorexia, nausea, vomiting or diarrhea. No abdominal pain.  GENITOURINARY:  NO dysuria. No increased frequency. No retention. No incontinence.  NEUROLOGICAL:  See HPI  MUSCULOSKELETAL:  No muscle, back pain, joint pain or stiffness.  HEMATOLOGIC:  No anemia, bleeding or bruising.  PSYCHIATRIC:    ENDOCRINOLOGIC:  No reports of sweating, cold or heat intolerance. No polyuria or polydipsia.        Vital Signs Last 24 Hrs  T(C): 36.8 (22 Aug 2017 17:30), Max: 36.8 (22 Aug 2017 17:30)  T(F): 98.3 (22 Aug 2017 17:30), Max: 98.3 (22 Aug 2017 17:30)  HR: 80 (22 Aug 2017 17:30) (80 - 80)  BP: 144/98 (22 Aug 2017 17:30) (144/98 - 144/98)  BP(mean): --  RR: 18 (22 Aug 2017 17:30) (18 - 18)  SpO2: 98% (22 Aug 2017 17:30) (98% - 98%)      General appearance: No acute distress , Well appearing     Neurological Exam:  Mental Status: Orientated to self, date and place.  Attention intact.  No dysarthria. Speech fluent.  Cranial Nerves:   PERRL, EOMI, VFF, no nystagmus.    CN V1-3 intact to light touch .  No facial asymmetry.  Hearing intact bilaterally.  Tongue  midline.  Sternocleidomastoid and Trapezius intact bilaterally.    Motor:   Tone: normal.                  Strength:     [] Upper extremity                      Delt       Bicep    Tricep                                           R         /        5/5        5/5       5/5                                       L          5/        5/5        5/5       5/5  [] Lower extremity                     HF          KE          KF        DF         PF                                        R        /        5/5        5/5       5/5       5/5                     slight drift      L         /        5/5       5/5       5/5        5/5             Dysmetria: None to finger-nose-finger or heel-shin-heel  No truncal ataxia.    Tremor: No resting, postural or action tremor.  No myoclonus.    Sensation: intact to light touch      Gait: normal.      Other:                                  14.2   5.85  )-----------( 204      ( 22 Aug 2017 18:13 )             40.9       Radiology    CT:   MRI  EKG:  tele:  TTE:  EEG:

## 2017-08-24 NOTE — DISCHARGE NOTE ADULT - PLAN OF CARE
Maintain adequate control of your blood pressure. Goal BP < 130/80. Continue low sodium diet. Follow up with PCP and/or cardiologist for ongoing medical management of your hypertension. Continue medications as prescribed. Low salt diet. Maintain adequate control of your cholesterol levels. Goal LDL < 70. Follow up with PCP for ongoing medical management. Continue medications as prescribed. Low cholesterol diet. Continue ASA and Plavix as prescribed to prevent future stroke. Follow up with neurologist as outpatient for ongoing management. Follow up with the EP team to have a loop recorder placed. Continue medications as prescribed.

## 2017-08-24 NOTE — DISCHARGE NOTE ADULT - CARE PROVIDER_API CALL
Libman, Richard B (MD), Neurology; Vascular Neurology  [address] [address]  Phone: [phone]  Fax: [phone] Libman, Richard B (MD), Neurology; Vascular Neurology  [address] [address]  Phone: [phone]  Fax: [phone]    Nathan Landers), Cardiology; Internal Medicine  [address] [place] [id number]  Phone: [phone]  Fax: [phone]

## 2017-08-24 NOTE — PROGRESS NOTE ADULT - SUBJECTIVE AND OBJECTIVE BOX
Patient is a 57y old  Female who presents with a chief complaint of Right sided upper and lower extremity weakness (24 Aug 2017 12:33)      SUBJECTIVE / OVERNIGHT EVENTS: Daughter at bedside providing translation services. Pt feels well, R sided weakness has completely resolved, no headache, no SOB, no CP, no palpitations.  States that she has felt "vibrations in her body" in the past, but doesn't believe it was related to her heart.       MEDICATIONS  (STANDING):  simvastatin 20 milliGRAM(s) Oral at bedtime  aspirin enteric coated 81 milliGRAM(s) Oral daily  propranolol 40 milliGRAM(s) Oral daily  clopidogrel Tablet 75 milliGRAM(s) Oral daily  heparin  Injectable 5000 Unit(s) SubCutaneous every 12 hours    MEDICATIONS  (PRN):      Vital Signs Last 24 Hrs  T(C): 36.9 (24 Aug 2017 14:00), Max: 36.9 (24 Aug 2017 14:00)  T(F): 98.5 (24 Aug 2017 14:00), Max: 98.5 (24 Aug 2017 14:00)  HR: 70 (24 Aug 2017 14:00) (68 - 83)  BP: 148/78 (24 Aug 2017 14:00) (111/76 - 148/78)  BP(mean): --  RR: 16 (24 Aug 2017 14:00) (14 - 16)  SpO2: 99% (24 Aug 2017 14:00) (99% - 100%)  CAPILLARY BLOOD GLUCOSE      PHYSICAL EXAM  GENERAL: NAD, well-developed  HEAD:  Atraumatic, Normocephalic  EYES: EOMI, PERRLA, conjunctiva and sclera clear  NECK: Supple, No JVD  CHEST/LUNG: Clear to auscultation bilaterally; No wheeze  HEART: Regular rate and rhythm; No murmurs, rubs, or gallops  ABDOMEN: Soft, Nontender, Nondistended; Bowel sounds present  EXTREMITIES:  2+ Peripheral Pulses, No clubbing, cyanosis, or edema  NEURO: b/l UE and LE sensory and motor fully intact  PSYCH: AAOx3  SKIN: No rashes or lesions    LABS:                        16.4   7.77  )-----------( 199      ( 24 Aug 2017 07:00 )             46.8     08-24    142  |  103  |  14  ----------------------------<  98  3.7   |  24  |  0.72    Ca    9.5      24 Aug 2017 07:00  Mg     2.1     08-24    TPro  7.5  /  Alb  4.2  /  TBili  0.4  /  DBili  x   /  AST  29  /  ALT  21  /  AlkPhos  56  08-22    PT/INR - ( 22 Aug 2017 18:13 )   PT: 10.6 SEC;   INR: 0.95          PTT - ( 22 Aug 2017 18:13 )  PTT:31.2 SEC  CARDIAC MARKERS ( 23 Aug 2017 21:15 )  x     / < 0.06 ng/mL / 63 u/L / 1.27 ng/mL / x      CARDIAC MARKERS ( 22 Aug 2017 18:13 )  x     / < 0.06 ng/mL / 92 u/L / 1.36 ng/mL / x              RADIOLOGY & ADDITIONAL TESTS:    Imaging Personally Reviewed:    < from: MRA Neck w/Cont (08.23.17 @ 06:38) >  IMPRESSION:    BRAIN MRI:   Acute-subacute lacunar infarct of the left posterior/medial frontal lobe.   No acute hemorrhage.    BRAIN MRA:   No significant stenosis, occlusion, or aneurysm.    NECK MRA:  Extracranial MR angiography is within normal limits by NASCET criteria.    < end of copied text >      < from: TTE with Doppler (w/Cont) (08.24.17 @ 11:00) >  CONCLUSIONS:  1. Normal trileaflet aortic valve.  2. Normal left ventricular internal dimensions and wall  thicknesses.  3. Normal left ventricular systolic function. No segmental  wall motion abnormalities.  4. Normal right ventricular size and function.    < end of copied text >  Consultant(s) Notes Reviewed: EP and Neuro    Care Discussed with Consultants/Other Providers: EP

## 2017-08-24 NOTE — DISCHARGE NOTE ADULT - CARE PROVIDERS DIRECT ADDRESSES
[email or web address]_Unknown

## 2017-08-24 NOTE — DISCHARGE NOTE ADULT - NS AS ACTIVITY OBS
Bathing allowed/Showering allowed/As tolerated/Walking-Indoors allowed/No Heavy lifting/straining/Do not drive or operate machinery/Walking-Outdoors allowed

## 2017-08-25 PROBLEM — Z00.00 ENCOUNTER FOR PREVENTIVE HEALTH EXAMINATION: Status: ACTIVE | Noted: 2017-08-25

## 2017-08-31 ENCOUNTER — APPOINTMENT (OUTPATIENT)
Dept: ELECTROPHYSIOLOGY | Facility: CLINIC | Age: 57
End: 2017-08-31

## 2018-11-30 NOTE — H&P ADULT - NSHPRISKHIVSCREEN_GEN_ALL_CORE
no tingling/no vomiting/no chills/no decreased eating/drinking/no fever Offered and patient declined

## 2022-12-25 NOTE — ED ADULT TRIAGE NOTE - NS ED NURSE DIRECT TO ROOM YN
pt. received to room 4 A&Ox4 ambulatory p/w syncope. pt. endorses x2 syncopal episodes this AM, approx. 15 minutes apart. pt. endorses hitting her head x2, endorses approx. 3-4 minutes of LOC. ne neuro deficits noted at this time. pt. also endorses B/L flank pain s/p kidney biopsy. NAD noted at this time. respirations even and unlabored on RA. pending MD orders. NSR on cardiac monitor. comfort measures provided. safety precautions maintained. No

## 2023-11-28 NOTE — DISCHARGE NOTE ADULT - NS AS DC ANTIHYPERTENSIVE YN
Quality 226: Preventive Care And Screening: Tobacco Use: Screening And Cessation Intervention: Patient screened for tobacco use and is an ex/non-smoker
Detail Level: Detailed
yes

## 2024-11-14 NOTE — DISCHARGE NOTE ADULT - NS AS DC STROKE DX YN
and velcro compression,   Right lateral wound - fibrillar to size, Gentamicin cream, (both done) optilock (make sure over wound,). Foam to medial ankle. Coflex calamine, 6 inch ace to right leg- these will be changed at your next visit unless they slide down or cause pain. If they slide, gets wet, or cause pain please call the wound care center, you may need to come in to be re-wrapped. If you are unable to get to your follow up appointment you will need to remove your wraps at home & place some kind of compression.  Elevate your legs when sitting..     Compression Wraps  Location: both leg  Coflex calamine    Your doctor has ordered compression therapy for your wound. Compression bandages reduce the swelling, or edema, in your legs and prevent it from returning. The wound care staff will apply your compression wrap. It must be removed and re-applied at least weekly. As the swelling decreases, the boot no longer provides adequate compression and you need a new one. Once applied, you need to know how to take care of your compression wrap.     The boot must stay dry. Do not get it wet in the shower or tub. You may do a partial bath, or you can cover the boot with a large plastic bag, secured at the top, so that no water can get in.    Avoid standing in one place for long periods of time. If you must  one place, shift your weight and change positions often.    If you have CHF, consult your doctor before following the next two recommendations for leg elevation.     When sitting, elevate your legs on pillows, or put blocks under the foot of your bed. Your legs should be higher than your heart.    If your boot becomes painful, or you notice an increase in swelling in your toes, numbness or tingling, or purple color to your toes, remove the wrap and call the Wound Care Center. If it is after hours, call your doctor for instructions or go to the nearest emergency room.     Nails clipped 8/30/2024     Home Care  yes